# Patient Record
Sex: FEMALE | Race: WHITE | NOT HISPANIC OR LATINO
[De-identification: names, ages, dates, MRNs, and addresses within clinical notes are randomized per-mention and may not be internally consistent; named-entity substitution may affect disease eponyms.]

---

## 2017-03-20 ENCOUNTER — APPOINTMENT (OUTPATIENT)
Dept: PULMONOLOGY | Facility: CLINIC | Age: 62
End: 2017-03-20

## 2017-03-20 VITALS
SYSTOLIC BLOOD PRESSURE: 140 MMHG | WEIGHT: 220 LBS | HEART RATE: 66 BPM | OXYGEN SATURATION: 96 % | HEIGHT: 66 IN | BODY MASS INDEX: 35.36 KG/M2 | TEMPERATURE: 98.2 F | DIASTOLIC BLOOD PRESSURE: 80 MMHG

## 2017-03-20 DIAGNOSIS — R91.1 SOLITARY PULMONARY NODULE: ICD-10-CM

## 2019-09-12 ENCOUNTER — TRANSCRIPTION ENCOUNTER (OUTPATIENT)
Age: 64
End: 2019-09-12

## 2019-09-13 ENCOUNTER — APPOINTMENT (OUTPATIENT)
Dept: BARIATRICS/WEIGHT MGMT | Facility: CLINIC | Age: 64
End: 2019-09-13
Payer: COMMERCIAL

## 2019-09-13 VITALS
DIASTOLIC BLOOD PRESSURE: 82 MMHG | HEART RATE: 56 BPM | OXYGEN SATURATION: 98 % | SYSTOLIC BLOOD PRESSURE: 153 MMHG | BODY MASS INDEX: 33.91 KG/M2 | WEIGHT: 211 LBS | HEIGHT: 66 IN | TEMPERATURE: 98.2 F

## 2019-09-13 PROCEDURE — 99205 OFFICE O/P NEW HI 60 MIN: CPT

## 2019-10-22 NOTE — PHYSICAL EXAM
[de-identified] : high class mallampati  [Normal] : affect appropriate [de-identified] : central adpiosity

## 2019-10-22 NOTE — HISTORY OF PRESENT ILLNESS
[FreeTextEntry1] : 63 yo woman with obesity, DM2, metabolic syndrome, anxiety/depression presents for initial eval and mgt of obesity HAs lost weight a few times in the past but has been regainin recently. still >10% below lifetime max.  Eats 3 meals per day and snacks at night.  tries to eat low fat When she is doing it regularly wlkas/goes to gym 5-7 days per week.  Lately no exercise.

## 2019-10-22 NOTE — ASSESSMENT
[FreeTextEntry1] : 65 yo woman with obesity, DM2, metabolic syndrome, anxiety/depression \par \par Recommend the following:\par check metabolic labs\par whole foods  plant based eating strategy limiting refined carbs and added fats\par keep food journal\par track daily activity\par more moderate intensity cardio\par no added meds for now\par manage ANASTASIA\par rtc in 4 weeks.\par \par

## 2019-10-25 ENCOUNTER — APPOINTMENT (OUTPATIENT)
Dept: BARIATRICS/WEIGHT MGMT | Facility: CLINIC | Age: 64
End: 2019-10-25
Payer: COMMERCIAL

## 2019-10-25 VITALS
OXYGEN SATURATION: 98 % | HEART RATE: 56 BPM | HEIGHT: 66 IN | WEIGHT: 201 LBS | TEMPERATURE: 98.7 F | BODY MASS INDEX: 32.3 KG/M2 | DIASTOLIC BLOOD PRESSURE: 74 MMHG | SYSTOLIC BLOOD PRESSURE: 144 MMHG

## 2019-10-25 PROCEDURE — 99214 OFFICE O/P EST MOD 30 MIN: CPT

## 2019-11-11 NOTE — HISTORY OF PRESENT ILLNESS
[FreeTextEntry1] : 63 yo woman with obesity, DM2, metabolic syndrome, anxiety/depression returns for f/u.  She has switched to a mainly WFPB approach to eating and has lost 10 lbs in previous 6 weeks.  She feels generally very well and has more energy.  she is without new complaints today.  \par

## 2019-11-11 NOTE — ASSESSMENT
[FreeTextEntry1] : BARIATRIC SURGERY HISTORY: none\par \par OBESITY COMORBIDITIES: DM2, metabolic syndrome\par \par ANTI-OBESITY MEDICATIONS: none\par \par OBESITY MEDICATION SIDE EFFECTS: N/A\par \par \par 63 yo woman with obesity, DM2, metabolic syndrome, anxiety/depression \par \par Recommend the following:\par cont current lifestyle changes based on WFPB strategy\par increase physical activity as tolerated\par \par rtc in 4 weeks.\par \par

## 2019-12-06 ENCOUNTER — APPOINTMENT (OUTPATIENT)
Dept: BARIATRICS/WEIGHT MGMT | Facility: CLINIC | Age: 64
End: 2019-12-06
Payer: COMMERCIAL

## 2019-12-06 VITALS
BODY MASS INDEX: 31.02 KG/M2 | DIASTOLIC BLOOD PRESSURE: 74 MMHG | WEIGHT: 193 LBS | TEMPERATURE: 97.5 F | SYSTOLIC BLOOD PRESSURE: 150 MMHG | HEART RATE: 59 BPM | HEIGHT: 66 IN | OXYGEN SATURATION: 98 %

## 2019-12-06 PROCEDURE — 99214 OFFICE O/P EST MOD 30 MIN: CPT

## 2019-12-09 NOTE — REASON FOR VISIT
[Initial Consultation] : an initial consultation for [Hypertension] : hypertension [Obesity] : obesity

## 2019-12-09 NOTE — ASSESSMENT
[FreeTextEntry1] : BARIATRIC SURGERY HISTORY: none\par \par OBESITY COMORBIDITIES: DM2, metabolic syndrome\par \par ANTI-OBESITY MEDICATIONS: none\par \par OBESITY MEDICATION SIDE EFFECTS: N/A\par \par \par 65 yo woman with obesity, DM2, metabolic syndrome, anxiety/depression \par \par Recommend the following:\par cont current lifestyle changes based on WFPB strategy\par increase physical activity as tolerated\par cont with regular f/u\par recheck metabolic labs at 3 month inverval \par rtc in 4 weeks.\par \par

## 2019-12-09 NOTE — HISTORY OF PRESENT ILLNESS
[FreeTextEntry1] : 63 yo woman with obesity, DM2, metabolic syndrome, anxiety/depression returns for f/u.  She continues on a mainly WFPB approach to eating and has lost 18  lbs in previous 10 weeks.  She feels generally very well and has more energy.  she is without new complaints today.  \par

## 2020-01-24 ENCOUNTER — APPOINTMENT (OUTPATIENT)
Dept: BARIATRICS/WEIGHT MGMT | Facility: CLINIC | Age: 65
End: 2020-01-24
Payer: COMMERCIAL

## 2020-01-24 VITALS
WEIGHT: 189 LBS | DIASTOLIC BLOOD PRESSURE: 76 MMHG | SYSTOLIC BLOOD PRESSURE: 150 MMHG | OXYGEN SATURATION: 98 % | BODY MASS INDEX: 30.37 KG/M2 | HEART RATE: 53 BPM | HEIGHT: 66 IN | TEMPERATURE: 98.2 F

## 2020-01-24 DIAGNOSIS — E78.49 OTHER HYPERLIPIDEMIA: ICD-10-CM

## 2020-01-24 PROCEDURE — 99214 OFFICE O/P EST MOD 30 MIN: CPT

## 2020-02-09 PROBLEM — E78.49 OTHER HYPERLIPIDEMIA: Status: ACTIVE | Noted: 2019-10-22

## 2020-02-09 NOTE — HISTORY OF PRESENT ILLNESS
[FreeTextEntry1] : 63 yo woman with obesity, DM2, metabolic syndrome, anxiety/depression returns for f/u.  She continues on a mainly WFPB approach to eating and has lost 22  lbs in previous 16 weeks.  She feels generally very well and has more energy.  she is without new complaints today.  \par

## 2020-03-17 ENCOUNTER — EMERGENCY (EMERGENCY)
Facility: HOSPITAL | Age: 65
LOS: 1 days | Discharge: HOME | End: 2020-03-17
Attending: EMERGENCY MEDICINE | Admitting: EMERGENCY MEDICINE
Payer: COMMERCIAL

## 2020-03-17 VITALS
RESPIRATION RATE: 18 BRPM | HEART RATE: 72 BPM | OXYGEN SATURATION: 96 % | SYSTOLIC BLOOD PRESSURE: 194 MMHG | TEMPERATURE: 99 F | WEIGHT: 177.91 LBS | HEIGHT: 66 IN | DIASTOLIC BLOOD PRESSURE: 76 MMHG

## 2020-03-17 DIAGNOSIS — R05 COUGH: ICD-10-CM

## 2020-03-17 DIAGNOSIS — R53.83 OTHER FATIGUE: ICD-10-CM

## 2020-03-17 DIAGNOSIS — Z20.828 CONTACT WITH AND (SUSPECTED) EXPOSURE TO OTHER VIRAL COMMUNICABLE DISEASES: ICD-10-CM

## 2020-03-17 DIAGNOSIS — R09.81 NASAL CONGESTION: ICD-10-CM

## 2020-03-17 DIAGNOSIS — R50.9 FEVER, UNSPECIFIED: ICD-10-CM

## 2020-03-17 PROCEDURE — 99283 EMERGENCY DEPT VISIT LOW MDM: CPT | Mod: 25

## 2020-03-17 PROCEDURE — 93005 ELECTROCARDIOGRAM TRACING: CPT

## 2020-03-17 PROCEDURE — 71045 X-RAY EXAM CHEST 1 VIEW: CPT

## 2020-03-17 PROCEDURE — 71045 X-RAY EXAM CHEST 1 VIEW: CPT | Mod: 26

## 2020-03-17 PROCEDURE — 87798 DETECT AGENT NOS DNA AMP: CPT

## 2020-03-17 PROCEDURE — 93010 ELECTROCARDIOGRAM REPORT: CPT

## 2020-03-17 PROCEDURE — 87486 CHLMYD PNEUM DNA AMP PROBE: CPT

## 2020-03-17 PROCEDURE — 87633 RESP VIRUS 12-25 TARGETS: CPT

## 2020-03-17 PROCEDURE — 87635 SARS-COV-2 COVID-19 AMP PRB: CPT

## 2020-03-17 PROCEDURE — 87581 M.PNEUMON DNA AMP PROBE: CPT

## 2020-03-17 PROCEDURE — 99284 EMERGENCY DEPT VISIT MOD MDM: CPT | Mod: 25

## 2020-03-17 RX ORDER — ACETAMINOPHEN 500 MG
650 TABLET ORAL ONCE
Refills: 0 | Status: COMPLETED | OUTPATIENT
Start: 2020-03-17 | End: 2020-03-17

## 2020-03-17 RX ADMIN — Medication 650 MILLIGRAM(S): at 22:42

## 2020-03-17 NOTE — ED PROVIDER NOTE - OBJECTIVE STATEMENT
64F pmh htn, cad, dm p/w sob, cough, fatigue, fever 101F at home, nasal congestion. Worsening sob today. +COVID contact in past 2 wks via daughter-in-law. No abd pain, no cp, no n/v, dysuria, no unilateral weakness.

## 2020-03-17 NOTE — ED PROVIDER NOTE - DIAGNOSTIC INTERPRETATION
Chest x-ray interpreted by ER Physician Dr. Maynard  Findings: heart size normal, no infiltrates, no pleural effusion, no PTX, no appreciated fracture.

## 2020-03-17 NOTE — ED PROVIDER NOTE - PATIENT PORTAL LINK FT
You can access the FollowMyHealth Patient Portal offered by  by registering at the following website: [email or web address]. By joining [employer]’s FollowMyHealth portal, you will also be able to view your health information using other applications (apps) compatible with our system.

## 2020-03-17 NOTE — ED PROVIDER NOTE - CLINICAL SUMMARY MEDICAL DECISION MAKING FREE TEXT BOX
64F pmh htn, cad, dm p/w sob, cough, fatigue, fever 101F at home, nasal congestion. Worsening sob today. +COVID contact in past 2 wks via daughter-in-law. No abd pain, no cp, no n/v, dysuria, no unilateral weakness. Exam noted for cough, nasal congestion, no rales, no wheeze. Concern viral uri, consider COVID given pandemic & exposure, less likely pna, doubt cardiac given sx at this time. Feeling much improved, no acute life threatening illness. Pt seeking discharge.

## 2020-03-17 NOTE — ED ADULT NURSE NOTE - OBJECTIVE STATEMENT
Pt presents reporting cough and shortness of breath after being exposed to her daughter in law who recently tested positive for COVID19. Pt presents speaking in full sentences without difficulty. Pt reports no hx of fevers at home. Pt reports hx of DM and HTN.

## 2020-03-17 NOTE — ED PROVIDER NOTE - NSFOLLOWUPINSTRUCTIONS_ED_ALL_ED_FT
Immediately return to the Emergency Department or call 911 for any high fever, trouble breathing, severe vomiting, worsening pain, or any other concerns.    You have been swabbed for COVID-19 as well as a variety of viruses which may cause your symptoms.     Viral Respiratory Infection    A viral respiratory infection is an illness that affects parts of the body used for breathing, like the lungs, nose, and throat. It is caused by a germ called a virus. Symptoms can include runny nose, coughing, sneezing, fatigue, body aches, sore throat, fever, or headache. Over the counter medicine can be used to manage the symptoms but the infection typically goes away on its own in 5 to 10 days.     SEEK IMMEDIATE MEDICAL CARE IF YOU HAVE ANY OF THE FOLLOWING SYMPTOMS: shortness of breath, chest pain, fever over 10 days, or lightheadedness/dizziness.    You must self quarantine for 14 days until cleared of symptoms. There is a high probability you have the current corona virus (COVID-19)    Novel Coronavirus (COVID-19)  Patient Discharge Packet    What is a coronavirus?  Coronaviruses are a large family of viruses that cause illnesses ranging from the common cold to more severe diseases such as Middle East Respiratory Syndrome (MERS) and Severe Acute Respiratory Syndrome (SARS).    What is Novel Coronavirus (COVID-19)?  COVID-19 is a new strain of Coronavirus that has not been previously identified in humans. COVID-19 was identified in [place], [place], [place] in December 2019 (COVID-19). COVID-19 has since been identified outside of [place], in a growing number of countries internationally, including the [place].  Where can I find the most recent information about COVID-19?  The Centers for Disease Control and Prevention (CDC) is closely monitoring the outbreak caused by the COVID-19. For the latest information about COVID- 19, visit the CDC website at https://www.cdc.gov/coronavirus/index.html    How are coronaviruses spread?  Coronaviruses can be transmitted from person-to- person, usually after close contact with an infected person, for example, in a household, workplace, or healthcare setting via droplets that become airborne after a cough or sneeze by an affected person. These droplets can then infect a nearby person. It is likely transmission also occurs by touching recently contaminated surfaces.    What are the symptoms of coronavirus infection?  It depends on the virus, but common signs include fever and/or respiratory symptoms such as cough and shortness of breath. In more severe cases, infection can cause pneumonia, severe acute respiratory syndrome, kidney failure and even death. Fortunately, most cases of COVID-19 have an illness no different than the influenza “flu”. With a majority of these patients having mild symptoms and overall mortality which appears to be not much different than the flu.    Is there a treatment for a COVID-19?  There is no specific treatment for disease caused by COVID-19. However, many of the symptoms can be treated based on the patient’s clinical condition. Supportive care for infected persons can be highly effective.    What can I do to protect myself?  Washing your hands, covering your cough, and disinfecting surfaces are the best precautionary measures. It is also advisable to avoid close contact with anyone showing symptoms of respiratory illness such as coughing and sneezing. Those with symptoms should wear a surgical mask when around others.    What can I do to protect those around me?  If you have been identified as someone who may be infected with COVID-19, we recommend you follow the self-isolation procedures outlined below to protect those around you and limit the spread of this virus.    March 3, 2020  Recommendations for Patients Advised to Self-Isolate for Possible COVID-19 Exposure We recommend the below precautionary steps from now until 14 days from when you returned from your travel or date of your last known possible contact:  ? Do not go to work, school, or public areas. Avoid using public transportation, ride-sharing, or taxis. ? As much as possible, separate yourself from other people in your home. If you can, you should stay in a room and away from other people in your home. Also, you should use a separate bathroom, if available. ? Wear the supplied mask whenever you are around other people. ? If you have a non-urgent medical appointment, please reschedule for a later date. If the appointment is urgent, please call the healthcare provider and tell them that you are on self-isolation for possible COVID-19. This will help the healthcare provider’s office take steps to keep other people from getting infected or exposed. If you can reschedule routine appointments, do so. ? Wash your hands often with soap and water for at least 15 to 20 seconds or clean your hands with an alcohol-based hand  that contains 60 to 95% alcohol, covering all surfaces of your hands and rubbing them together until they feel dry. Soap and water should be used preferentially if hands are visibly dirty. ? Cover your mouth and nose with a tissue when you cough or sneeze. Throw used tissues in a lined trash can; immediately wash your hands. ? Avoid touching your eyes, nose, and mouth with your hands. ? Avoid sharing personal household items. You should not share dishes, drinking glasses, cups, eating utensils, towels, or bedding with other people or pets in your home. After using these items, they should be washed thoroughly with soap and water. ? Clean and disinfect all “high-touch” surfaces every day. High touch surfaces include counters, tabletops, doorknobs, light switches, remote controls, bathroom fixtures, toilets, phones, keyboards, tablets, and bedside tables. Also, clean any surfaces that may have blood, stool, or body fluids on them.

## 2020-03-17 NOTE — ED PROVIDER NOTE - PHYSICAL EXAMINATION
VITAL SIGNS: I have reviewed nursing notes and confirm.  CONSTITUTIONAL: Well-developed; well-nourished; in no acute distress.  SKIN: Skin is warm and dry, no acute rash.  HEAD: Normocephalic; atraumatic.  EYES:  EOM intact; conjunctiva and sclera clear.  ENT: +nasal discharge; airway clear.  NECK: Supple; Voluntary FROM  CARD: No rubs appreciated, Regular rate and rhythm.  RESP: No wheezes, no rales. No respiratory distress. +occasional cough  ABD: Soft; non-distended; non-tender; no rebound or guarding  EXT: Normal ROM. No cyanosis or edema.  NEURO: Alert, oriented. Grossly unremarkable.  PSYCH: Cooperative, appropriate.

## 2020-03-17 NOTE — ED ADULT NURSE NOTE - NSIMPLEMENTINTERV_GEN_ALL_ED
Implemented All Universal Safety Interventions:  [place] to call system. Call bell, personal items and telephone within reach. Instruct patient to call for assistance. Room bathroom lighting operational. Non-slip footwear when patient is off stretcher. Physically safe environment: no spills, clutter or unnecessary equipment. Stretcher in lowest position, wheels locked, appropriate side rails in place.

## 2020-03-18 LAB — RAPID RVP RESULT: SIGNIFICANT CHANGE UP

## 2020-03-23 LAB — SARS-COV-2 RNA SPEC QL NAA+PROBE: DETECTED

## 2020-04-10 ENCOUNTER — APPOINTMENT (OUTPATIENT)
Dept: BARIATRICS/WEIGHT MGMT | Facility: CLINIC | Age: 65
End: 2020-04-10
Payer: COMMERCIAL

## 2020-04-10 PROCEDURE — 99446 NTRPROF PH1/NTRNET/EHR 5-10: CPT

## 2020-07-10 ENCOUNTER — TRANSCRIPTION ENCOUNTER (OUTPATIENT)
Age: 65
End: 2020-07-10

## 2021-06-17 NOTE — ED PROVIDER NOTE - BIRTH SEX
History  Chief Complaint   Patient presents with    Fall     pt poor historian, reports was anxious and fell forward, denies head strike but complains of pain to right foot and left shin       57 y o  F p/w right foot and left shin x 8 hours ago  Pt tripped at home while walking into kitchen  Denies LOC or head strike  Having pain at left shin and top of right foot  Pt has been ambulatory  She has not taken anything for pain  History provided by:  Patient   used: Yes (Family)    Fall  Mechanism of injury: fall    Injury location:  Leg and foot  Leg injury location:  L lower leg  Foot injury location:  Top of R foot  Incident location:  Home  Time since incident:  8 hours  Arrived directly from scene: no    Suspicion of alcohol use: no    Suspicion of drug use: no    Prior to arrival data:     Bystander interventions:  None    Loss of consciousness: no      Amnesic to event: no      Immobilization:  None  Associated symptoms: no abdominal pain, no headaches, no loss of consciousness, no nausea, no neck pain and no vomiting        Prior to Admission Medications   Prescriptions Last Dose Informant Patient Reported? Taking?    Blood Glucose Monitoring Suppl (ONE TOUCH ULTRA 2) w/Device KIT  Self No No   Sig: by Does not apply route 3 (three) times a day   Continuous Blood Gluc  (FreeStyle Broderick 14 Day Boomer) [facility]   No No   Sig: Use 1 Device daily   Continuous Blood Gluc Sensor (FreeStyle Broderick 14 Day Sensor) [facility]   No No   Sig: Use 1 each every 14 (fourteen) days   DULoxetine (CYMBALTA) 30 mg delayed release capsule   No Yes   Sig: TAKE 1 CAPSULE BY MOUTH 2 (TWICE)  A DAY   Dulaglutide (Trulicity) 1 5 WN/4 7UF SOPN  Self No No   Sig: Inject 0 5 mL (1 5 mg total) under the skin once a week   Patient not taking: Reported on 5/19/2021   Insulin Disposable Pump (V-Go 40) KIT   No Yes   Sig: Use daily   Insulin Pen Needle (BD Pen Needle Emily U/F) 32G X 4 MM MISC  Self No No   Sig: USE FOUR (4) TIMES A DAY   Levemir FlexTouch 100 units/mL injection pen  Self No No   Sig: INJECT 45 UNITS UNDER THE SKIN DAILY AT BEDTIME   OneTouch Delica Lancets 21L MISC   No No   Sig: USE 3 (THREE) TIMES A DAY   OneTouch Ultra test strip   No No   Sig: USE 3 (THREE) TIMES A DAY USE AS INSTRUCTED   albuterol (2 5 mg/3 mL) 0 083 % nebulizer solution  Self No Yes   Sig: Take 1 vial (2 5 mg total) by nebulization every 6 (six) hours as needed for wheezing or shortness of breath   albuterol (PROVENTIL HFA,VENTOLIN HFA) 90 mcg/act inhaler  Self Yes Yes   Sig: Inhale 1 puff every 4 (four) hours as needed     amLODIPine (NORVASC) 10 mg tablet   No Yes   Sig: TAKE 1 TABLET BY MOUTH DAILY   anastrozole (ARIMIDEX) 1 mg tablet  Self No Yes   Sig: TAKE 1 TABLET BY MOUTH DAILY   aspirin 81 mg chewable tablet  Self Yes Yes   Sig: Chew 81 mg daily   atorvastatin (LIPITOR) 10 mg tablet   No Yes   Sig: TAKE 1 TABLET (10 MG TOTAL) BY MOUTH DAILY   cyclobenzaprine (FLEXERIL) 10 mg tablet   No Yes   Sig: Take 1 tablet (10 mg total) by mouth daily at bedtime   diclofenac sodium (VOLTAREN) 1 %  Self No No   Sig: Apply 2 g topically 4 (four) times a day   Patient not taking: Reported on 5/19/2021   fluticasone-salmeterol (Advair HFA) 115-21 MCG/ACT inhaler   No Yes   Sig: Inhale 2 puffs 2 (two) times a day Rinse mouth after use  fluticasone-umeclidinium-vilanterol (Trelegy Ellipta) 100-62 5-25 MCG/INH inhaler   Yes Yes   Sig: Inhale 1 puff daily Rinse mouth after use  insulin lispro (HumaLOG) 100 units/mL injection   No Yes   Sig: Inject 76 Units under the skin daily via V-Go device     insulin lispro (HumaLOG) 100 units/mL injection pen  Self No Yes   Sig: Inject 16 Units under the skin 3 (three) times a day with meals Inject 16 units TID   levothyroxine 50 mcg tablet  Self No No   Sig: Take 1 tablet (50 mcg total) by mouth daily   lidocaine (XYLOCAINE) 5 % ointment  Self No No   Sig: Apply topically as needed for mild pain Apply 5 minutes prior to insulin injection   Patient not taking: Reported on 5/19/2021   loratadine (CLARITIN) 10 mg tablet   No No   Sig: Take 1 tablet (10 mg total) by mouth daily   losartan (COZAAR) 50 mg tablet   No No   Sig: TAKE 1 TABLET (50 MG TOTAL) BY MOUTH DAILY   magnesium gluconate (MAGONATE) 500 mg tablet   No No   Sig: Take 1 tablet (500 mg total) by mouth daily   metoprolol tartrate (LOPRESSOR) 25 mg tablet  Self No No   Sig: Take 0 5 tablets (12 5 mg total) by mouth every 12 (twelve) hours   omeprazole (PriLOSEC) 20 mg delayed release capsule   No No   Sig: TAKE 1 CAPSULE (20 MG TOTAL) BY MOUTH DAILY   oxyCODONE (ROXICODONE) 5 mg immediate release tablet  Self Yes No   Sig: Take 5 mg by mouth every 6 (six) hours as needed for moderate pain      Facility-Administered Medications: None       Past Medical History:   Diagnosis Date    Abdominal infection (HCC)     h-pylori    Abnormal chest x-ray 4/5/2019    Asthma     Breast cancer ([facility] ) 06/26/2018    Cancer ([facility] )     BREAST    Diabetes mellitus ([facility] )     Hiatal hernia     History of chemotherapy     History of radiation therapy     Hypercholesterolemia     Hypertension     Hypothyroid     Renal disorder     Rheumatoid arthritis ([facility] )        Past Surgical History:   Procedure Laterality Date    BREAST BIOPSY Right 05/23/2018    DCIS    BREAST LUMPECTOMY Right 6/26/2018    Procedure: RIGHT BREAST NEEDLE LOCALIZATION X2 WITH RIGHT BREAST LUMPECTOMY ( NEEDLE LOC @ 1000); Surgeon: Maddison Tam MD;  Location: [facility];  Service: Surgical Oncology    BREAST LUMPECTOMY Right 8/2/2018    Procedure: LYMPHOSCINITGRAPHY INTRAOPERATIVE LYMPHATIC MAPPING , RIGHT SENTINEL NODE BIOPSY, REEXCISION  RIGHT BREAST LUMPECTOMY CAVITY (SUPERIOR MARGIN);   Surgeon: Maddison Tam MD;  [facility];  Service: Surgical Oncology    BREAST SURGERY Left     CATARACT EXTRACTION, BILATERAL Bilateral     EGD AND COLONOSCOPY N/A 9/5/2018    Procedure: EGD AND COLONOSCOPY;  Surgeon: Harrold Buerger, MD;  Location[facility]; Service: Gastroenterology    [facility] GUIDED CENTRAL VENOUS ACCESS DEVICE INSERTION  9/13/2018    KNEE SURGERY Right     MAMMO NEEDLE LOCALIZATION RIGHT (ALL INC) Right 6/26/2018    MAMMO STEREOTACTIC BREAST BIOPSY RIGHT (ALL INC) Right 5/23/2018    RETINAL DETACHMENT SURGERY Right     TUBAL LIGATION      TUNNELED VENOUS PORT PLACEMENT Left 9/13/2018    Procedure: INSERTION VENOUS PORT (PORT-A-CATH); Surgeon: Julio Collado MD;  [facility];  Service: Surgical Oncology       Family History   Problem Relation Age of Onset    Diabetes Mother     Hypertension Mother     Diabetes Father     Hypertension Father     Diabetes Brother     Hypertension Brother     Prostate cancer Brother     Cancer Maternal Grandfather     No Known Problems Sister     No Known Problems Daughter     No Known Problems Sister     No Known Problems Sister     No Known Problems Sister     No Known Problems Sister     No Known Problems Daughter     No Known Problems Daughter      I have reviewed and agree with the history as documented  E-Cigarette/Vaping    E-Cigarette Use Never User      E-Cigarette/Vaping Substances    Nicotine No     THC No     CBD No     Flavoring No     Other No     Unknown No      Social History     Tobacco Use    Smoking status: Never Smoker    Smokeless tobacco: Never Used   Vaping Use    Vaping Use: Never used   Substance Use Topics    Alcohol use: No    Drug use: No       Review of Systems   Gastrointestinal: Negative for abdominal pain, nausea and vomiting  Musculoskeletal: Negative for neck pain  Left shin and right foot   Neurological: Negative for loss of consciousness and headaches  All other systems reviewed and are negative  Physical Exam  Physical Exam  Vitals and nursing note reviewed  Constitutional:       General: She is not in acute distress       Appearance: She is well-developed  She is not ill-appearing, toxic-appearing or diaphoretic  HENT:      Right Ear: External ear normal       Left Ear: External ear normal    Eyes:      General: No scleral icterus  Conjunctiva/sclera:      Right eye: Right conjunctiva is not injected  Left eye: Left conjunctiva is not injected  Neck:      Vascular: No JVD  Trachea: Phonation normal    Cardiovascular:      Pulses: Normal pulses  Dorsalis pedis pulses are 2+ on the right side and 2+ on the left side  Pulmonary:      Effort: Pulmonary effort is normal       Breath sounds: No stridor  Musculoskeletal:         General: No deformity  Normal range of motion  Right knee: Normal       Left knee: Normal       Right lower leg: Normal       Left lower leg: Tenderness (anterolateral shin) present  No swelling or deformity  Right ankle: Normal       Left ankle: Normal       Right foot: Tenderness (Top of foot) present  No swelling, laceration or crepitus  Normal pulse  Left foot: Normal pulse  Comments: No gross deformity  No obvious swelling  No signs of septic joint  Skin:     General: Skin is warm and dry  Findings: Abrasion (left shin ) present  No bruising, ecchymosis, erythema, laceration or rash  Comments: No signs of septic joint  No crepitus  No discoloration  No excessive warmth  Neurological:      Mental Status: She is alert  Sensory: No sensory deficit  Motor: Motor function is intact     Psychiatric:         Behavior: Behavior normal          Vital Signs  ED Triage Vitals [06/17/21 1514]   Temperature Pulse Respirations Blood Pressure SpO2   98 1 °F (36 7 °C) 96 19 150/72 99 %      Temp Source Heart Rate Source Patient Position - Orthostatic VS BP Location FiO2 (%)   Oral Monitor -- -- --      Pain Score       Worst Possible Pain           Vitals:    06/17/21 1514   BP: 150/72   Pulse: 96         Visual Acuity      ED Medications  Medications acetaminophen (TYLENOL) tablet 650 mg (650 mg Oral Given 6/17/21 1557)       Diagnostic Studies  Results Reviewed     None                 XR tibia fibula 2 views LEFT   ED Interpretation by Augustin Crews,  (06/17 1556)   No fracture      XR foot 3+ views RIGHT   ED Interpretation by Augustin Crews,  (06/17 1557)   No fracture                 Procedures  Procedures         ED Course  ED Course as of Jun 17 1620   Thu Jun 17, 2021   1601 Pt talking on cell phone in NAD  Updated pt/family on negative xrays  Instructed her to take Tylenol for pain  [id number] Family wants something "stronger than Tylenol "  Will give rx for Naproxen  80 Family states they are going to [facility] to "get a stronger pain medicine "                                SBIRT 20yo+      Most Recent Value   SBIRT (24 yo +)   In order to provide better care to our patients, we are screening all of our patients for alcohol and drug use  Would it be okay to ask you these screening questions? Yes Filed at: 06/17/2021 1547   Initial Alcohol Screen: US AUDIT-C    1  How often do you have a drink containing alcohol?  0 Filed at: 06/17/2021 1547   2  How many drinks containing alcohol do you have on a typical day you are drinking? 0 Filed at: 06/17/2021 1547   3a  Male UNDER 65: How often do you have five or more drinks on one occasion? 0 Filed at: 06/17/2021 1547   3b  FEMALE Any Age, or MALE 65+: How often do you have 4 or more drinks on one occassion? 0 Filed at: 06/17/2021 1547   Audit-C Score  0 Filed at: 06/17/2021 1547   MARGO: How many times in the past year have you    Used an illegal drug or used a prescription medication for non-medical reasons?   Never Filed at: 06/17/2021 1547                    MDM    Disposition  Final diagnoses:   Abrasion of left leg   Left leg pain   Right foot pain     Time reflects when diagnosis was documented in both MDM as applicable and the Disposition within this note     Time User Action Codes Description Comment    6/17/2021  3:57 PM Bk Luis Alberto Munroe Abrasion of left leg     6/17/2021  3:57 PM [address] Left leg pain     6/17/2021  3:57 PM [address] Right foot pain       ED Disposition     ED Disposition Condition Date/Time Comment    Discharge Stable Thu Jun 17, 2021  3:58 PM Jolieradha Mulligan discharge to home/self care  Follow-up Information    None         Patient's Medications   Discharge Prescriptions    ACETAMINOPHEN (TYLENOL) 500 MG TABLET    Take 1 tablet (500 mg total) by mouth every 6 (six) hours as needed for mild pain       Start Date: 6/17/2021 End Date: --       Order Dose: 500 mg       Quantity: 14 tablet    Refills: 0    NAPROXEN (NAPROSYN) 500 MG TABLET    Take 1 tablet (500 mg total) by mouth 2 (two) times a day with meals       Start Date: 6/17/2021 End Date: --       Order Dose: 500 mg       Quantity: 10 tablet    Refills: 0     No discharge procedures on file      PDMP Review     None          ED Provider  Electronically Signed by           Micky Gilford, DO  06/17/21 9320 Female

## 2022-01-11 ENCOUNTER — APPOINTMENT (OUTPATIENT)
Dept: BARIATRICS/WEIGHT MGMT | Facility: CLINIC | Age: 67
End: 2022-01-11
Payer: COMMERCIAL

## 2022-01-11 VITALS
OXYGEN SATURATION: 98 % | HEART RATE: 59 BPM | WEIGHT: 194.5 LBS | HEIGHT: 66 IN | DIASTOLIC BLOOD PRESSURE: 70 MMHG | SYSTOLIC BLOOD PRESSURE: 140 MMHG | BODY MASS INDEX: 31.26 KG/M2

## 2022-01-11 PROCEDURE — 99214 OFFICE O/P EST MOD 30 MIN: CPT

## 2022-01-13 ENCOUNTER — APPOINTMENT (OUTPATIENT)
Dept: INTERNAL MEDICINE | Facility: CLINIC | Age: 67
End: 2022-01-13
Payer: COMMERCIAL

## 2022-01-13 ENCOUNTER — RESULT REVIEW (OUTPATIENT)
Age: 67
End: 2022-01-13

## 2022-01-13 VITALS
DIASTOLIC BLOOD PRESSURE: 62 MMHG | HEART RATE: 66 BPM | SYSTOLIC BLOOD PRESSURE: 126 MMHG | OXYGEN SATURATION: 98 % | TEMPERATURE: 97.8 F | WEIGHT: 197 LBS | BODY MASS INDEX: 31.8 KG/M2

## 2022-01-13 DIAGNOSIS — Z82.2 FAMILY HISTORY OF DEAFNESS AND HEARING LOSS: ICD-10-CM

## 2022-01-13 DIAGNOSIS — F41.9 ANXIETY DISORDER, UNSPECIFIED: ICD-10-CM

## 2022-01-13 DIAGNOSIS — R06.83 SNORING: ICD-10-CM

## 2022-01-13 DIAGNOSIS — M79.642 PAIN IN LEFT HAND: ICD-10-CM

## 2022-01-13 DIAGNOSIS — R53.83 OTHER FATIGUE: ICD-10-CM

## 2022-01-13 DIAGNOSIS — F32.A ANXIETY DISORDER, UNSPECIFIED: ICD-10-CM

## 2022-01-13 DIAGNOSIS — Z23 ENCOUNTER FOR IMMUNIZATION: ICD-10-CM

## 2022-01-13 DIAGNOSIS — Z82.49 FAMILY HISTORY OF ISCHEMIC HEART DISEASE AND OTHER DISEASES OF THE CIRCULATORY SYSTEM: ICD-10-CM

## 2022-01-13 DIAGNOSIS — Z83.3 FAMILY HISTORY OF DIABETES MELLITUS: ICD-10-CM

## 2022-01-13 DIAGNOSIS — Z78.9 OTHER SPECIFIED HEALTH STATUS: ICD-10-CM

## 2022-01-13 DIAGNOSIS — F10.21 ALCOHOL DEPENDENCE, IN REMISSION: ICD-10-CM

## 2022-01-13 PROCEDURE — G0444 DEPRESSION SCREEN ANNUAL: CPT | Mod: 59

## 2022-01-13 PROCEDURE — 99205 OFFICE O/P NEW HI 60 MIN: CPT | Mod: 25

## 2022-01-13 PROCEDURE — G0442 ANNUAL ALCOHOL SCREEN 15 MIN: CPT

## 2022-01-13 RX ORDER — ESCITALOPRAM OXALATE 10 MG/1
10 TABLET ORAL
Refills: 0 | Status: ACTIVE | COMMUNITY
Start: 2021-05-07

## 2022-01-13 RX ORDER — AMLODIPINE AND ATORVASTATIN 5; 10 MG/1; MG/1
5-10 TABLET, COATED ORAL DAILY
Refills: 0 | Status: ACTIVE | COMMUNITY
Start: 2022-01-13

## 2022-01-13 RX ORDER — DULAGLUTIDE 0.75 MG/.5ML
0.75 INJECTION, SOLUTION SUBCUTANEOUS
Qty: 4 | Refills: 1 | Status: ACTIVE | COMMUNITY
Start: 2022-01-13

## 2022-01-13 RX ORDER — ASPIRIN ENTERIC COATED TABLETS 81 MG 81 MG/1
81 TABLET, DELAYED RELEASE ORAL
Refills: 0 | Status: ACTIVE | COMMUNITY
Start: 2022-01-13

## 2022-01-13 RX ORDER — MULTIVIT-MIN/FOLIC/VIT K/LYCOP 400-300MCG
25 MCG TABLET ORAL
Refills: 0 | Status: ACTIVE | COMMUNITY
Start: 2022-01-13

## 2022-01-13 RX ORDER — B-COMPLEX WITH VITAMIN C
TABLET ORAL
Refills: 0 | Status: ACTIVE | COMMUNITY
Start: 2022-01-13

## 2022-01-13 RX ORDER — LOSARTAN POTASSIUM AND HYDROCHLOROTHIAZIDE 25; 100 MG/1; MG/1
100-25 TABLET ORAL
Qty: 90 | Refills: 0 | Status: COMPLETED | COMMUNITY
Start: 2021-08-31

## 2022-01-13 RX ORDER — AMLODIPINE BESYLATE 5 MG/1
5 TABLET ORAL
Qty: 90 | Refills: 0 | Status: DISCONTINUED | COMMUNITY
Start: 2021-05-07

## 2022-01-13 RX ORDER — DULAGLUTIDE 1.5 MG/.5ML
1.5 INJECTION, SOLUTION SUBCUTANEOUS
Qty: 6 | Refills: 0 | Status: DISCONTINUED | COMMUNITY
Start: 2021-05-11

## 2022-01-13 NOTE — PHYSICAL EXAM
[No Acute Distress] : no acute distress [Well-Appearing] : well-appearing [Normal Sclera/Conjunctiva] : normal sclera/conjunctiva [EOMI] : extraocular movements intact [Normal Outer Ear/Nose] : the outer ears and nose were normal in appearance [No JVD] : no jugular venous distention [Supple] : supple [No Respiratory Distress] : no respiratory distress  [No Accessory Muscle Use] : no accessory muscle use [Clear to Auscultation] : lungs were clear to auscultation bilaterally [Normal Rate] : normal rate  [Regular Rhythm] : with a regular rhythm [Normal S1, S2] : normal S1 and S2 [No Edema] : there was no peripheral edema [Soft] : abdomen soft [No CVA Tenderness] : no CVA  tenderness [No Spinal Tenderness] : no spinal tenderness [No Joint Swelling] : no joint swelling [Grossly Normal Strength/Tone] : grossly normal strength/tone [No Rash] : no rash [Coordination Grossly Intact] : coordination grossly intact [No Focal Deficits] : no focal deficits [Normal Gait] : normal gait [Normal Affect] : the affect was normal [Normal Mood] : the mood was normal [Normal Insight/Judgement] : insight and judgment were intact [de-identified] : Mallampatti 3+ [de-identified] : varicose veins, spider veins

## 2022-01-13 NOTE — REVIEW OF SYSTEMS
[Hearing Loss] : hearing loss [Joint Pain] : joint pain [Anxiety] : anxiety [Depression] : depression [Negative] : Heme/Lymph [Nasal Discharge] : no nasal discharge [Back Pain] : no back pain [Suicidal] : not suicidal [FreeTextEntry4] : snoring [FreeTextEntry9] : thumb pain

## 2022-01-13 NOTE — ASSESSMENT
[FreeTextEntry1] : 66F PMH obesity, HTN, T2DM, HLD, anxiety/depression, bilateral carpal tunnel, spontaneous pneumothorax, who presents as a new patient to establish care.\par \par # Hand pain: Primarily thumb and palm, patient has hx bilateral carpal tunnel, tried using thumb splint without success.\par - X-ray hand/wrist \par - may consider trial of wrist splint\par - Will consider Ortho referral vs occupational therapy referral pending x-ray results.\par \par # Fatigue: Reporting non-restful sleep, snoring, feeling like falling asleep during the day, with associated obesity, HTN, and mallampatti 3-4 puts patient at high risk for ANASTASIA.\par - C/w weight management\par - Sleep study referral\par \par # Metabolic syndrome: \par - C/w weight management\par - C/w trulicity as per weight management\par - C/w current BP and cholesterol medications for now\par - BMP, lipid panel, A1c\par - annual ophtho\par \par # Depression: \par - C/w lexapro\par - C/w therapist\par - C/w healthy lifestyle habits\par - F/u sleep referral\par \par # Venous insufficiency: varicose veins and spider veins\par - regular movement, avoid prolonged sitting/standing\par - foot elevation\par - c/w weight loss\par - compression stockings\par \par # HCM:\par - Pneumovax-23 \par - CBC, CMP, Lipid, A1c, Vit D, TSH

## 2022-01-13 NOTE — HEALTH RISK ASSESSMENT
[Former] : Former [20 or more] : 20 or more [1 or 2 (0 pts)] : 1 or 2 (0 points) [Never (0 pts)] : Never (0 points) [No] : In the past 12 months have you used drugs other than those required for medical reasons? No [1] : 2) Feeling down, depressed, or hopeless for several days (1) [PHQ-2 Positive] : PHQ-2 Positive [I have developed a follow-up plan documented below in the note.] : I have developed a follow-up plan documented below in the note. [Patient reported mammogram was normal] : Patient reported mammogram was normal [Patient reported PAP Smear was normal] : Patient reported PAP Smear was normal [No Retinopathy] : No retinopathy [YearQuit] : 1984 [[id number]] : 2 [EyeExamDate] : 12/21 [MammogramDate] : 01/14 [PapSmearDate] : 01/14 [ColonoscopyDate] : 01/19 [ColonoscopyComments] : Unknown result, to follow-up with GI for result

## 2022-01-13 NOTE — HISTORY OF PRESENT ILLNESS
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
[de-identified] : 66F PMH obesity, HTN, T2DM, HLD, anxiety/depression, bilateral carpal tunnel, spontaneous pneumothorax, who presents as a new patient to establish care.\par \par Her only acute complaint is chronic pain in the L hand, > 6 months, and associated weakness. Notes pain when she flexes the thumb and touches it to any of her fingers, as well as with pressure to the thenar eminence and proximal palm region. She has a history of carpal tunnel surgery bilaterally. Notes pain when she picks up her grandkids. No pain when typing, some on phone but doesn't use it too much. Her daughter had similar pain and a thumb brace which she tried for a while but it didn't help. \par She has used Tylenol a few times.\par \par Notes sleep is not restful, fatigue and feels she is falling asleep during the day, she does snore. She scheduled a sleep study several years ago but other things came up and she never went through with it. \par \par Obesity/DM: Following with Stu Calvert in weight management, restarted Trulicity 0.75 mg/wk, states she will be increasing to 1.5 mg/wk soon. \par Last ophtho appt 1 month ago. No changes in sensation or healing of feet. \par \par Depression: She feels her depression is well controlled on Lexapro, working with her therapist, and is enjoying spending time with her grandchildren, and enjoys her job.\par \par Immunizations: She was vaccinated against COVID-19 w/ Moderna x2, and received Pfizer as a booster. She had her flu shot this year, and is up to date with TDaP, Prevnar, and Shingrix. She has not had Pneumovax. \par \par Social Hx: She denies current tobacco, alcohol, or drug use. She is a former alcoholic, has not had a drink in 13 years. 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
Daniella Robb

## 2022-01-14 LAB
25(OH)D3 SERPL-MCNC: 20.9 NG/ML
ALBUMIN SERPL ELPH-MCNC: 4.5 G/DL
ALP BLD-CCNC: 95 U/L
ALT SERPL-CCNC: 16 U/L
ANION GAP SERPL CALC-SCNC: 12 MMOL/L
AST SERPL-CCNC: 20 U/L
BASOPHILS # BLD AUTO: 0.07 K/UL
BASOPHILS NFR BLD AUTO: 1.1 %
BILIRUB SERPL-MCNC: 0.5 MG/DL
BUN SERPL-MCNC: 17 MG/DL
CALCIUM SERPL-MCNC: 10.5 MG/DL
CHLORIDE SERPL-SCNC: 102 MMOL/L
CHOLEST SERPL-MCNC: 142 MG/DL
CO2 SERPL-SCNC: 27 MMOL/L
CREAT SERPL-MCNC: 1.02 MG/DL
EOSINOPHIL # BLD AUTO: 0.09 K/UL
EOSINOPHIL NFR BLD AUTO: 1.4 %
ESTIMATED AVERAGE GLUCOSE: 103 MG/DL
GLUCOSE SERPL-MCNC: 90 MG/DL
HBA1C MFR BLD HPLC: 5.2 %
HCT VFR BLD CALC: 36 %
HDLC SERPL-MCNC: 57 MG/DL
HGB BLD-MCNC: 12 G/DL
IMM GRANULOCYTES NFR BLD AUTO: 0.2 %
LDLC SERPL CALC-MCNC: 72 MG/DL
LYMPHOCYTES # BLD AUTO: 2.27 K/UL
LYMPHOCYTES NFR BLD AUTO: 34.3 %
MAN DIFF?: NORMAL
MCHC RBC-ENTMCNC: 32.2 PG
MCHC RBC-ENTMCNC: 33.3 GM/DL
MCV RBC AUTO: 96.5 FL
MONOCYTES # BLD AUTO: 0.68 K/UL
MONOCYTES NFR BLD AUTO: 10.3 %
NEUTROPHILS # BLD AUTO: 3.49 K/UL
NEUTROPHILS NFR BLD AUTO: 52.7 %
NONHDLC SERPL-MCNC: 85 MG/DL
PLATELET # BLD AUTO: 235 K/UL
POTASSIUM SERPL-SCNC: 4.7 MMOL/L
PROT SERPL-MCNC: 7.2 G/DL
RBC # BLD: 3.73 M/UL
RBC # FLD: 12.8 %
SODIUM SERPL-SCNC: 140 MMOL/L
TRIGL SERPL-MCNC: 66 MG/DL
TSH SERPL-ACNC: 0.68 UIU/ML
WBC # FLD AUTO: 6.61 K/UL

## 2022-01-17 NOTE — HISTORY OF PRESENT ILLNESS
[FreeTextEntry1] : 63 yo woman with obesity, DM2, metabolic syndrome, anxiety/depression returns for f/u.  She lost substantial weight on a mainly WFPB approach to eating and has lost rougly 25 lbs/  She feels she maintained most of that weight throughout the pandemic but has gained 8-10 lbs back over the past few months and is concerned that she is not on a good trajectory.  She previously was on glp-1 agonist but eventually stopped it and feels she does not have as much control as a result. \par \par  she is without new complaints today other than needing a new PCP.  \par

## 2022-02-15 ENCOUNTER — APPOINTMENT (OUTPATIENT)
Dept: BARIATRICS/WEIGHT MGMT | Facility: CLINIC | Age: 67
End: 2022-02-15
Payer: COMMERCIAL

## 2022-02-15 VITALS — HEIGHT: 66 IN | BODY MASS INDEX: 30.74 KG/M2 | WEIGHT: 191.25 LBS

## 2022-02-15 VITALS — HEART RATE: 61 BPM | DIASTOLIC BLOOD PRESSURE: 62 MMHG | SYSTOLIC BLOOD PRESSURE: 128 MMHG | OXYGEN SATURATION: 99 %

## 2022-02-15 PROCEDURE — 99213 OFFICE O/P EST LOW 20 MIN: CPT

## 2022-02-26 ENCOUNTER — RESULT REVIEW (OUTPATIENT)
Age: 67
End: 2022-02-26

## 2022-02-26 ENCOUNTER — APPOINTMENT (OUTPATIENT)
Dept: RADIOLOGY | Facility: CLINIC | Age: 67
End: 2022-02-26
Payer: COMMERCIAL

## 2022-02-26 ENCOUNTER — OUTPATIENT (OUTPATIENT)
Dept: OUTPATIENT SERVICES | Facility: HOSPITAL | Age: 67
LOS: 1 days | End: 2022-02-26
Payer: COMMERCIAL

## 2022-02-26 DIAGNOSIS — Z00.00 ENCOUNTER FOR GENERAL ADULT MEDICAL EXAMINATION WITHOUT ABNORMAL FINDINGS: ICD-10-CM

## 2022-02-26 PROCEDURE — 73130 X-RAY EXAM OF HAND: CPT

## 2022-02-26 PROCEDURE — 73110 X-RAY EXAM OF WRIST: CPT

## 2022-02-26 PROCEDURE — 73110 X-RAY EXAM OF WRIST: CPT | Mod: 26,LT

## 2022-02-26 PROCEDURE — 73130 X-RAY EXAM OF HAND: CPT | Mod: 26,LT

## 2022-02-28 ENCOUNTER — RESULT REVIEW (OUTPATIENT)
Age: 67
End: 2022-02-28

## 2022-02-28 ENCOUNTER — OUTPATIENT (OUTPATIENT)
Dept: OUTPATIENT SERVICES | Facility: HOSPITAL | Age: 67
LOS: 1 days | End: 2022-02-28
Payer: COMMERCIAL

## 2022-02-28 ENCOUNTER — APPOINTMENT (OUTPATIENT)
Dept: MAMMOGRAPHY | Facility: CLINIC | Age: 67
End: 2022-02-28
Payer: COMMERCIAL

## 2022-02-28 DIAGNOSIS — Z12.31 ENCOUNTER FOR SCREENING MAMMOGRAM FOR MALIGNANT NEOPLASM OF BREAST: ICD-10-CM

## 2022-02-28 PROCEDURE — 77063 BREAST TOMOSYNTHESIS BI: CPT | Mod: 26

## 2022-02-28 PROCEDURE — 77067 SCR MAMMO BI INCL CAD: CPT | Mod: 26

## 2022-02-28 PROCEDURE — 77067 SCR MAMMO BI INCL CAD: CPT

## 2022-02-28 PROCEDURE — 77063 BREAST TOMOSYNTHESIS BI: CPT

## 2022-03-02 DIAGNOSIS — M19.049 PRIMARY OSTEOARTHRITIS, UNSPECIFIED HAND: ICD-10-CM

## 2022-03-07 NOTE — HISTORY OF PRESENT ILLNESS
[FreeTextEntry1] : 67 yo woman with obesity, DM2, metabolic syndrome, anxiety/depression returns for f/u.  \par \par she has lost 3 lbs since last visit (now 191 lbs)\par eating more whole foods and plant based.\par now on trulicity 3.0mg without incident\par \par otherwise no new complaints today\par   ,

## 2022-03-07 NOTE — ASSESSMENT
[FreeTextEntry1] : BARIATRIC SURGERY HISTORY: none\par \par OBESITY COMORBIDITIES: DM2, metabolic syndrome\par \par ANTI-OBESITY MEDICATIONS: trulicity\par \par OBESITY MEDICATION SIDE EFFECTS: N/A\par \par \par 65 yo woman with obesity, DM2, metabolic syndrome, anxiety/depression \par \par Recommend the following:\par cont current lifestyle changes based on WFPB strategy\par increase physical activity as tolerated\par cont trulicity 3.0 mg\par \par rtc in 3-4 weeks.\par \par

## 2022-03-29 ENCOUNTER — APPOINTMENT (OUTPATIENT)
Dept: BARIATRICS/WEIGHT MGMT | Facility: CLINIC | Age: 67
End: 2022-03-29
Payer: COMMERCIAL

## 2022-03-29 VITALS
WEIGHT: 183 LBS | DIASTOLIC BLOOD PRESSURE: 82 MMHG | SYSTOLIC BLOOD PRESSURE: 124 MMHG | OXYGEN SATURATION: 98 % | BODY MASS INDEX: 29.41 KG/M2 | HEART RATE: 65 BPM | HEIGHT: 66 IN

## 2022-03-29 PROCEDURE — 99213 OFFICE O/P EST LOW 20 MIN: CPT

## 2022-03-29 NOTE — HISTORY OF PRESENT ILLNESS
[FreeTextEntry1] : 67 yo woman with obesity, DM2, metabolic syndrome, anxiety/depression returns for f/u.  \par \par has lost an additional 8 lbs.  Now down 11 since 1/2022 and 57 lbs in past 5+ years.\par eating more whole foods and plant based - though some deviation when traveling internationally \par now on trulicity 3.0mg without incident\par \par otherwise no new complaints today\par

## 2022-03-29 NOTE — ASSESSMENT
[FreeTextEntry1] : BARIATRIC SURGERY HISTORY: none\par \par OBESITY COMORBIDITIES: DM2, metabolic syndrome\par \par ANTI-OBESITY MEDICATIONS: trulicity\par \par OBESITY MEDICATION SIDE EFFECTS: N/A\par \par \par 65 yo woman with obesity, DM2, metabolic syndrome, anxiety/depression \par \par Recommend the following:\par cont current lifestyle changes based on WFPB strategy\par increase physical activity as tolerated\par cont trulicity 3.0 mg\par \par rtc in 4-6  weeks.\par \par

## 2022-03-31 ENCOUNTER — APPOINTMENT (OUTPATIENT)
Dept: ORTHOPEDIC SURGERY | Facility: CLINIC | Age: 67
End: 2022-03-31
Payer: COMMERCIAL

## 2022-03-31 ENCOUNTER — NON-APPOINTMENT (OUTPATIENT)
Age: 67
End: 2022-03-31

## 2022-03-31 VITALS
BODY MASS INDEX: 29.25 KG/M2 | HEIGHT: 66 IN | HEART RATE: 62 BPM | DIASTOLIC BLOOD PRESSURE: 77 MMHG | WEIGHT: 182 LBS | SYSTOLIC BLOOD PRESSURE: 133 MMHG

## 2022-03-31 DIAGNOSIS — M18.12 UNILATERAL PRIMARY OSTEOARTHRITIS OF FIRST CARPOMETACARPAL JOINT, LEFT HAND: ICD-10-CM

## 2022-03-31 PROCEDURE — 99203 OFFICE O/P NEW LOW 30 MIN: CPT

## 2022-03-31 RX ORDER — LOSARTAN POTASSIUM AND HYDROCHLOROTHIAZIDE 12.5; 1 MG/1; MG/1
100-12.5 TABLET ORAL
Qty: 30 | Refills: 0 | Status: ACTIVE | COMMUNITY
Start: 2022-02-22

## 2022-03-31 RX ORDER — COVID-19 TEST SPECIMEN COLLECT
MISCELLANEOUS MISCELLANEOUS
Qty: 1 | Refills: 0 | Status: ACTIVE | COMMUNITY
Start: 2022-01-30

## 2022-03-31 RX ORDER — DICLOFENAC SODIUM 1% 10 MG/G
1 GEL TOPICAL
Qty: 1 | Refills: 2 | Status: ACTIVE | COMMUNITY
Start: 2022-03-31 | End: 1900-01-01

## 2022-04-02 NOTE — PHYSICAL EXAM
[de-identified] : Left wrist\par Wrist motion : good without localizing findings\par Basal joint mild swelling\par Joint line tenderness 1-2+, recreating symptoms \par crepitus trace, 2+ pain\par \par Left hand:\par No A1 pulley tenderness and no triggering in any finger.\par No pertinent MP, PIP, or DIP joint contributory findings, except some Heberden's nodes; none are clinically painful.\par \par Right wrist\par Basal joint manipulation minimal to no crepitus\par No pain\par \par Left hand:\par No A1 pulley tenderness and no triggering in any finger.\par No pertinent MP, PIP, or DIP joint contributory findings, except some Heberden's nodes; none are clinically painful.\par \par \par Neurologic: Median, ulnar, and radial motor and sensory are intact. \par Carpal tunnel endoscopic surgical scars in the distal forearm bilaterally are fully healed.\par Skin: No cyanosis, clubbing, or rashes.\par Vascular: Radial pulses intact.\par Lymphatic: No streaking or epitrochlear adenopathy.\par The patient is awake, alert, and oriented. Affect appropriate. Cooperative.

## 2022-04-02 NOTE — HISTORY OF PRESENT ILLNESS
[FreeTextEntry1] : The patient is a 67 yo RHD female, BMI approximately 32, who is referred by PCP with complaints of left hand pain with associated weakness at the base of thumb that interferes with ADL, picking up grandchildren.  Patient also has history of "carpal tunnel syndrome" treated surgically, bilaterally, approx 2 yrs ago. Carlos Peterson MD.\par \par Patient has pain at the base of left thumb.  Radiographs were obtained at [facility] that shows stage II basal joint arthritis with slight sclerosis and narrowing.  No other notable hand or wrist radiographic findings.\par \par Patient works as  to  of [email or web address].\par Patient perceives constant pain at the basal joint.\par Pain exacerbated lifting grandchildren, most pinch activities.\par No pain on right.\par Patient has been taking over-the-counter naproxen, 2 tabs, 440 mg approximately every once a day or once every few days

## 2022-04-02 NOTE — DISCUSSION/SUMMARY
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  I have explained to patient that the incidence of basal joint arthritis increases with age and is common in individuals, especially women, and the seventh decade of life.  I utilized radiographs for educational purposes as well as providing printed educational information to patient.\par Treatment options include oral anti-inflammatory, topical anti-inflammatory and HMTS as initial treatment options.  I reviewed pros, cons, risk, advantages of each separately and in combination.\par Patient is willing to have a trial with oral meloxicam and to utilize HMTS.  Hand therapist is also instructed to provide joint education and activity preservation advice and technique.\par Prognosis limited because the condition is typically ongoing.\par Return as needed.\par A lengthy and detailed discussion was held with the patient regarding analysis, treatment, and recommendations. All questions have been answered. At the conclusion the patient expressed acceptance, understanding and agreement with the plan.

## 2022-07-14 ENCOUNTER — APPOINTMENT (OUTPATIENT)
Dept: INTERNAL MEDICINE | Facility: CLINIC | Age: 67
End: 2022-07-14

## 2022-07-14 VITALS
TEMPERATURE: 98.2 F | DIASTOLIC BLOOD PRESSURE: 70 MMHG | SYSTOLIC BLOOD PRESSURE: 114 MMHG | HEIGHT: 65.25 IN | BODY MASS INDEX: 28.31 KG/M2 | WEIGHT: 172 LBS | OXYGEN SATURATION: 97 % | HEART RATE: 59 BPM

## 2022-07-14 DIAGNOSIS — Z87.898 PERSONAL HISTORY OF OTHER SPECIFIED CONDITIONS: ICD-10-CM

## 2022-07-14 PROCEDURE — 99401 PREV MED CNSL INDIV APPRX 15: CPT

## 2022-07-14 PROCEDURE — 99214 OFFICE O/P EST MOD 30 MIN: CPT | Mod: 25

## 2022-07-14 NOTE — ASSESSMENT
[FreeTextEntry1] : 66F PMH obesity, HTN, T2DM, HLD, anxiety/depression, bilateral carpal tunnel, spontaneous pneumothorax, who presents for follow-up\par \par # HTN: She is on 3 antihypertensives (amlodipine 5 mg, Losartan 100 mg, HCTZ 25 mg, the one most recently added was HCTZ). Her BP today is well controlled at 114/70, which is likely due to a healthy WFPB diet and notable recent weight loss. There is some potential concern for ANASTASIA given elevated mallampatti and use of multiple antihypertensives (snoring unknown), however this would also improve with weight loss.\par - Will de-escalate antihypertensive regimen by removing HCTZ. Patient will take Amlodipine 5 mg + Losartan 100 mg. \par - Patient will start checking and recording home readings\par - Call office if significant elevation develops, can restart HCTZ 25 mg or half dose 12.5 mg depending on results\par - F/u in 1 month for BP check\par - Will hold off sleep study for now. \par - C/w weight management\par - C/w exercise\par - C/w healthy diet. \par \par # DM2/HLD: Well controlled on current regimen and lifestyle interventions. Recent labwork performed. \par \par # HCM: \par - Repeat mammogram, 6 months from last as per recommendation based on results.

## 2022-07-14 NOTE — PHYSICAL EXAM
[No Acute Distress] : no acute distress [Well-Appearing] : well-appearing [Normal Sclera/Conjunctiva] : normal sclera/conjunctiva [EOMI] : extraocular movements intact [Normal Outer Ear/Nose] : the outer ears and nose were normal in appearance [No JVD] : no jugular venous distention [Supple] : supple [No Respiratory Distress] : no respiratory distress  [No Accessory Muscle Use] : no accessory muscle use [Clear to Auscultation] : lungs were clear to auscultation bilaterally [Normal Rate] : normal rate  [Regular Rhythm] : with a regular rhythm [Normal S1, S2] : normal S1 and S2 [No Edema] : there was no peripheral edema [No Joint Swelling] : no joint swelling [No Rash] : no rash [Coordination Grossly Intact] : coordination grossly intact [Normal Gait] : normal gait [Speech Grossly Normal] : speech grossly normal [Normal Affect] : the affect was normal [Normal Mood] : the mood was normal

## 2022-07-14 NOTE — HISTORY OF PRESENT ILLNESS
[de-identified] : 66F PMH obesity, HTN, T2DM, HLD, anxiety/depression, bilateral carpal tunnel, spontaneous pneumothorax, who presents for follow-up\par 6-mo follow-up\par \par She has no acute complaints today.\par She has been following with weight management, has been adherent to an entirely plant-based, primarily whole food diet, avoids processed food. She has continued to lose weight. \par She has been compliant with her medications.\par \par Is active with her grandchildren (1 yr boy, 3 yr girl) but feels tired after being with them for a few hours straight.

## 2022-08-04 ENCOUNTER — APPOINTMENT (OUTPATIENT)
Dept: INTERNAL MEDICINE | Facility: CLINIC | Age: 67
End: 2022-08-04

## 2022-08-04 DIAGNOSIS — J02.9 ACUTE PHARYNGITIS, UNSPECIFIED: ICD-10-CM

## 2022-08-04 PROCEDURE — 99213 OFFICE O/P EST LOW 20 MIN: CPT

## 2022-08-04 NOTE — REVIEW OF SYSTEMS
[Sore Throat] : sore throat [Shortness Of Breath] : no shortness of breath [Wheezing] : no wheezing [Cough] : cough [Negative] : Psychiatric

## 2022-08-04 NOTE — PHYSICAL EXAM
[No Acute Distress] : no acute distress [Well-Appearing] : well-appearing [Normal Sclera/Conjunctiva] : normal sclera/conjunctiva [EOMI] : extraocular movements intact [Normal Outer Ear/Nose] : the outer ears and nose were normal in appearance [No JVD] : no jugular venous distention [No Respiratory Distress] : no respiratory distress  [Normal Rate] : normal rate  [Regular Rhythm] : with a regular rhythm [Normal S1, S2] : normal S1 and S2 [No Edema] : there was no peripheral edema [Soft] : abdomen soft [Non Tender] : non-tender [Non-distended] : non-distended [No CVA Tenderness] : no CVA  tenderness [No Spinal Tenderness] : no spinal tenderness [No Joint Swelling] : no joint swelling [Grossly Normal Strength/Tone] : grossly normal strength/tone [No Rash] : no rash [de-identified] : erythema oropharynx

## 2022-08-04 NOTE — HISTORY OF PRESENT ILLNESS
[FreeTextEntry8] : 66F PMH obesity, HTN, T2DM, HLD, anxiety/depression, bilateral carpal tunnel, spontaneous pneumothorax, who presents for an acute visit with sore throat\par \par Her symptoms have been ongoing for over 1 week.\par She denies fevers, chills, PND, nasal congestion, CP, shortness of breath\par She has had some cough.\par She was with her daughter last week and she was diagnosed with strep throat. \par She tested negative for COVID-19 on Friday and yesterday, b oth were home tests.

## 2022-08-04 NOTE — ASSESSMENT
[FreeTextEntry1] : 66F PMH obesity, HTN, T2DM, HLD, anxiety/depression, bilateral carpal tunnel, spontaneous pneumothorax, who presents for an acute visit with sore throat\par \par # Pharyngitis: although Centor criteria is very low for strep, given that she had close contact with her daughter just who was diagnosed with strep pharyngitis and this was just prior to her developing symptoms, and her own symptoms have been primarily of throat pain, will opt to treat. May stop if COVID-19 PCR returns positive and will check throat culture\par - Throat culture\par - Covid PCR\par - Will treat preemptively w/amoxicillin given timing of close exposure to strep aligning with her symptoms which are primarily throat pain.

## 2022-08-05 LAB — SARS-COV-2 N GENE NPH QL NAA+PROBE: NOT DETECTED

## 2022-08-08 ENCOUNTER — NON-APPOINTMENT (OUTPATIENT)
Age: 67
End: 2022-08-08

## 2022-08-08 LAB — BACTERIA THROAT CULT: NORMAL

## 2022-09-01 ENCOUNTER — APPOINTMENT (OUTPATIENT)
Dept: INTERNAL MEDICINE | Facility: CLINIC | Age: 67
End: 2022-09-01

## 2022-09-01 VITALS
WEIGHT: 174 LBS | TEMPERATURE: 97.8 F | DIASTOLIC BLOOD PRESSURE: 73 MMHG | HEIGHT: 65 IN | BODY MASS INDEX: 28.99 KG/M2 | SYSTOLIC BLOOD PRESSURE: 136 MMHG | HEART RATE: 62 BPM | OXYGEN SATURATION: 98 %

## 2022-09-01 DIAGNOSIS — Z87.09 PERSONAL HISTORY OF OTHER DISEASES OF THE RESPIRATORY SYSTEM: ICD-10-CM

## 2022-09-01 PROCEDURE — 99213 OFFICE O/P EST LOW 20 MIN: CPT

## 2022-09-01 RX ORDER — LOSARTAN POTASSIUM 100 MG/1
100 TABLET, FILM COATED ORAL
Qty: 90 | Refills: 1 | Status: DISCONTINUED | COMMUNITY
Start: 2022-07-14 | End: 2022-09-01

## 2022-09-01 RX ORDER — AMOXICILLIN 500 MG/1
500 TABLET, FILM COATED ORAL
Qty: 20 | Refills: 0 | Status: DISCONTINUED | COMMUNITY
Start: 2022-08-04 | End: 2022-09-01

## 2022-09-01 NOTE — PHYSICAL EXAM
[No Acute Distress] : no acute distress [Well-Appearing] : well-appearing [Normal Sclera/Conjunctiva] : normal sclera/conjunctiva [EOMI] : extraocular movements intact [Normal Outer Ear/Nose] : the outer ears and nose were normal in appearance [Normal Oropharynx] : the oropharynx was normal [No JVD] : no jugular venous distention [No Lymphadenopathy] : no lymphadenopathy [Supple] : supple [No Respiratory Distress] : no respiratory distress  [No Accessory Muscle Use] : no accessory muscle use [Clear to Auscultation] : lungs were clear to auscultation bilaterally [Normal Rate] : normal rate  [Regular Rhythm] : with a regular rhythm [Normal S1, S2] : normal S1 and S2 [No Edema] : there was no peripheral edema [Soft] : abdomen soft [Non Tender] : non-tender [Non-distended] : non-distended [Normal Bowel Sounds] : normal bowel sounds [Normal Anterior Cervical Nodes] : no anterior cervical lymphadenopathy [No Rash] : no rash [Coordination Grossly Intact] : coordination grossly intact [Normal Gait] : normal gait [Speech Grossly Normal] : speech grossly normal [Memory Grossly Normal] : memory grossly normal [Normal Affect] : the affect was normal [Normal Mood] : the mood was normal [Normal Insight/Judgement] : insight and judgment were intact

## 2022-09-01 NOTE — ASSESSMENT
[FreeTextEntry1] : 67F PMH obesity, HTN, T2DM, HLD, anxiety/depression, bilateral carpal tunnel, spontaneous pneumothorax, who presents for follow-up.\par \par # HTN: In July, was on Losartan-HCTZ 100-25 mg/d + Amlodipine 5 mg/d, we stopped HCTZ and at subsequent visit increased to 12.5 mg/d and her systolic BP has been running higher, borderline elevated.\par - C/w Losartan 100 mg/d and Amlodipine 5 mg/d\par - Will increase HCTZ back to 25 mg/d\par - C/w exercise, WFPB diet, weight management\par - Discussed goal to optimize BP for now, if she remains well controlled in the future to the degree that we consider medication reduction, we may want to consider reducing an alternative agent (ie Losartan or Amlodipine).\par - F/u in 2-3 months. \par \par # URI symptoms: resolved\par \par # HCM: patient's insurance covers mammogram elsewhere, re-ordered to be printed for requisition to do outside of [facility].

## 2022-09-01 NOTE — HISTORY OF PRESENT ILLNESS
[de-identified] : 67F PMH obesity, HTN, T2DM, HLD, anxiety/depression, bilateral carpal tunnel, spontaneous pneumothorax, who presents for follow-up.\par \par Her sore throat and URI symptoms from last month have completely resolved. \par \par At her visit in July, when she was on Losartan-HCTZ 100-25 mg/d + Amlodipine 5 mg/d, her BP was very well controlled so we tried discontinuing the HCTZ. Her BP ran high, so we increased HCTZ to 12.5 mg/d. She checks her BP twice per day and records it, has brought the log, and it has been ranging, diastolic always <80, systolic ranging significantly but appears to average in the high 130's. \par She feels less comfortable with how they are controlled now compared to when she stopped HCTZ. \par No symptoms.

## 2022-09-02 ENCOUNTER — RX RENEWAL (OUTPATIENT)
Age: 67
End: 2022-09-02

## 2022-09-02 RX ORDER — DULAGLUTIDE 3 MG/.5ML
3 INJECTION, SOLUTION SUBCUTANEOUS
Qty: 6 | Refills: 1 | Status: ACTIVE | COMMUNITY
Start: 2022-01-21 | End: 1900-01-01

## 2022-09-12 ENCOUNTER — APPOINTMENT (OUTPATIENT)
Dept: MAMMOGRAPHY | Facility: CLINIC | Age: 67
End: 2022-09-12

## 2022-09-16 ENCOUNTER — APPOINTMENT (OUTPATIENT)
Dept: INTERNAL MEDICINE | Facility: CLINIC | Age: 67
End: 2022-09-16

## 2022-09-16 VITALS
TEMPERATURE: 97.6 F | OXYGEN SATURATION: 99 % | SYSTOLIC BLOOD PRESSURE: 150 MMHG | BODY MASS INDEX: 28.32 KG/M2 | DIASTOLIC BLOOD PRESSURE: 81 MMHG | HEIGHT: 65 IN | HEART RATE: 68 BPM | WEIGHT: 170 LBS

## 2022-09-16 DIAGNOSIS — M72.2 PLANTAR FASCIAL FIBROMATOSIS: ICD-10-CM

## 2022-09-16 DIAGNOSIS — M76.60 ACHILLES TENDINITIS, UNSPECIFIED LEG: ICD-10-CM

## 2022-09-16 PROCEDURE — G0008: CPT

## 2022-09-16 PROCEDURE — 90662 IIV NO PRSV INCREASED AG IM: CPT

## 2022-09-16 PROCEDURE — 99214 OFFICE O/P EST MOD 30 MIN: CPT | Mod: 25

## 2022-09-16 NOTE — HISTORY OF PRESENT ILLNESS
[de-identified] : 67F PMH obesity, HTN, T2DM, HLD, anxiety/depression, bilateral carpal tunnel, spontaneous pneumothorax, who presents for follow-up.\par \par She presents for R ankle/foot pain.\par She describes it as feeling very tight at the heel. Feels she can barely walk except on her toes. \par Is aggravated by activites such as walking on sand and jumping in pool (activities with strong calf/planter flexion or passive stretch). \par She has been wearing flats, flip flops, and water slippers which she feels haven't offered much support. \par Has been ongoing for a few months. \par She has not taken any medication or any interventions for it, but she is on Meloxicam 7.5 mg for chronic hand pain.

## 2022-09-16 NOTE — ASSESSMENT
[FreeTextEntry1] : 67F PMH obesity, HTN, T2DM, HLD, anxiety/depression, bilateral carpal tunnel, spontaneous pneumothorax, who presents for follow-up.\par \par # Achilles tendonopathy c/b suspected plantar fasciitis: symptoms primarily appear to be tendonopathy of achilles with some distribution of pain along plantar fascia. \par - Avoid aggravating activities, but maintain gentle activity\par - Ice after use, heat when stretching or massage\par - PT referral\par - Podiatry referral\par \par # HCM:\par - Flu shot today.

## 2022-09-16 NOTE — PHYSICAL EXAM
[No Acute Distress] : no acute distress [Well-Appearing] : well-appearing [Normal Sclera/Conjunctiva] : normal sclera/conjunctiva [EOMI] : extraocular movements intact [Normal Outer Ear/Nose] : the outer ears and nose were normal in appearance [Normal Oropharynx] : the oropharynx was normal [No JVD] : no jugular venous distention [Supple] : supple [No Respiratory Distress] : no respiratory distress  [No Accessory Muscle Use] : no accessory muscle use [Clear to Auscultation] : lungs were clear to auscultation bilaterally [Normal Rate] : normal rate  [Regular Rhythm] : with a regular rhythm [Normal S1, S2] : normal S1 and S2 [No Edema] : there was no peripheral edema [Soft] : abdomen soft [Non Tender] : non-tender [Non-distended] : non-distended [Normal Anterior Cervical Nodes] : no anterior cervical lymphadenopathy [No CVA Tenderness] : no CVA  tenderness [No Spinal Tenderness] : no spinal tenderness [No Joint Swelling] : no joint swelling [Grossly Normal Strength/Tone] : grossly normal strength/tone [No Rash] : no rash [Coordination Grossly Intact] : coordination grossly intact [No Focal Deficits] : no focal deficits [Normal Gait] : normal gait [Speech Grossly Normal] : speech grossly normal [Memory Grossly Normal] : memory grossly normal [Normal Affect] : the affect was normal [Normal Insight/Judgement] : insight and judgment were intact

## 2022-10-03 RX ORDER — LOSARTAN POTASSIUM AND HYDROCHLOROTHIAZIDE 25; 100 MG/1; MG/1
100-25 TABLET ORAL DAILY
Qty: 90 | Refills: 3 | Status: ACTIVE | COMMUNITY
Start: 2022-01-13 | End: 1900-01-01

## 2022-12-16 ENCOUNTER — APPOINTMENT (OUTPATIENT)
Dept: ELECTROPHYSIOLOGY | Facility: CLINIC | Age: 67
End: 2022-12-16

## 2022-12-16 ENCOUNTER — APPOINTMENT (OUTPATIENT)
Dept: INTERNAL MEDICINE | Facility: CLINIC | Age: 67
End: 2022-12-16
Payer: COMMERCIAL

## 2022-12-16 ENCOUNTER — NON-APPOINTMENT (OUTPATIENT)
Age: 67
End: 2022-12-16

## 2022-12-16 VITALS
HEIGHT: 65 IN | DIASTOLIC BLOOD PRESSURE: 79 MMHG | HEART RATE: 57 BPM | WEIGHT: 177 LBS | TEMPERATURE: 97.8 F | SYSTOLIC BLOOD PRESSURE: 128 MMHG | BODY MASS INDEX: 29.49 KG/M2 | OXYGEN SATURATION: 98 %

## 2022-12-16 VITALS
HEIGHT: 65 IN | RESPIRATION RATE: 14 BRPM | OXYGEN SATURATION: 100 % | BODY MASS INDEX: 29.49 KG/M2 | HEART RATE: 66 BPM | DIASTOLIC BLOOD PRESSURE: 57 MMHG | WEIGHT: 177 LBS | SYSTOLIC BLOOD PRESSURE: 138 MMHG

## 2022-12-16 DIAGNOSIS — I44.7 LEFT BUNDLE-BRANCH BLOCK, UNSPECIFIED: ICD-10-CM

## 2022-12-16 DIAGNOSIS — Z00.00 ENCOUNTER FOR GENERAL ADULT MEDICAL EXAMINATION W/OUT ABNORMAL FINDINGS: ICD-10-CM

## 2022-12-16 DIAGNOSIS — E66.9 OBESITY, UNSPECIFIED: ICD-10-CM

## 2022-12-16 DIAGNOSIS — R01.2 OTHER CARDIAC SOUNDS: ICD-10-CM

## 2022-12-16 DIAGNOSIS — I10 ESSENTIAL (PRIMARY) HYPERTENSION: ICD-10-CM

## 2022-12-16 DIAGNOSIS — E78.5 HYPERLIPIDEMIA, UNSPECIFIED: ICD-10-CM

## 2022-12-16 DIAGNOSIS — E11.65 TYPE 2 DIABETES MELLITUS WITH HYPERGLYCEMIA: ICD-10-CM

## 2022-12-16 DIAGNOSIS — M25.569 PAIN IN UNSPECIFIED KNEE: ICD-10-CM

## 2022-12-16 PROCEDURE — 99204 OFFICE O/P NEW MOD 45 MIN: CPT

## 2022-12-16 PROCEDURE — 93000 ELECTROCARDIOGRAM COMPLETE: CPT | Mod: 59

## 2022-12-16 PROCEDURE — 99214 OFFICE O/P EST MOD 30 MIN: CPT | Mod: 25

## 2022-12-16 NOTE — ASSESSMENT
[FreeTextEntry1] : 67F PMH obesity, HTN, T2DM, HLD, anxiety/depression, bilateral carpal tunnel, spontaneous pneumothorax, who presents for follow-up.\par \par # 3rd heart sound, LBBB: EKG obtained due to 3rd heart sound, TTE ordered. EKG subsequently showing LBBB, no prior EKGs on file. Prior cardiologist (Josie Franco, [facility]) office called to expedite faxing over most recent EKG (~2 years ago), this was mildly bradycardic but did not show a LBBB, results along with stress echo being scanned to chart. Patient is completely asymptomatic at this time, and comorbidities are well controlled. \par - Cardiology referral, appointment expedited given new LBBB, to be seen today.\par - TTE and blood work ordered\par \par # Plantar fasciitis: undergoing PT, improving\par - C/w PT\par \par # Bilateral knee pain: L>R, left newer, 1 month. Suspect OA\par - X-ray knees\par - Ortho referral per pt request\par - May benefit from PT.

## 2022-12-16 NOTE — PHYSICAL EXAM
[No Acute Distress] : no acute distress [Well-Appearing] : well-appearing [Normal Sclera/Conjunctiva] : normal sclera/conjunctiva [EOMI] : extraocular movements intact [Normal Outer Ear/Nose] : the outer ears and nose were normal in appearance [Normal Oropharynx] : the oropharynx was normal [No JVD] : no jugular venous distention [Supple] : supple [No Respiratory Distress] : no respiratory distress  [No Accessory Muscle Use] : no accessory muscle use [Clear to Auscultation] : lungs were clear to auscultation bilaterally [Normal Rate] : normal rate  [Regular Rhythm] : with a regular rhythm [Normal S1, S2] : normal S1 and S2 [No Edema] : there was no peripheral edema [Declined Breast Exam] : declined breast exam  [No Axillary Lymphadenopathy] : no axillary lymphadenopathy [Soft] : abdomen soft [Non Tender] : non-tender [Non-distended] : non-distended [Normal Supraclavicular Nodes] : no supraclavicular lymphadenopathy [Normal Anterior Cervical Nodes] : no anterior cervical lymphadenopathy [No CVA Tenderness] : no CVA  tenderness [No Spinal Tenderness] : no spinal tenderness [No Joint Swelling] : no joint swelling [Grossly Normal Strength/Tone] : grossly normal strength/tone [No Rash] : no rash [Coordination Grossly Intact] : coordination grossly intact [Normal Gait] : normal gait [Speech Grossly Normal] : speech grossly normal [Normal Affect] : the affect was normal [Normal Mood] : the mood was normal [Normal Insight/Judgement] : insight and judgment were intact

## 2022-12-16 NOTE — HISTORY OF PRESENT ILLNESS
[de-identified] : 67F PMH obesity, HTN, T2DM, HLD, anxiety/depression, bilateral carpal tunnel, spontaneous pneumothorax, who presents for follow-up.\par \par She hasn't been checking her BP as much, but has remained within normal range, BP normal today. \par \par She has been undergoing PT for plantar fasciitis, slowly starting to improve.\par She notes knee pain developed on that knee and more recently and significantly on the L knee as well, which started about 1 month ago, is worst going down stairs, is not taking anything.

## 2022-12-16 NOTE — HISTORY OF PRESENT ILLNESS
[FreeTextEntry1] : 67-year-old female with history of obesity HTN diabetes type 2 dyslipidemia anxiety depression bilateral carpal tunnel syndrome spontaneous pneumothorax left bundle branch block on ECG.  Patient has exercise stress echo from 12/2020 negative for ischemia patient achieved target heart rate, exercised for 9 minutes to 93% of mphr.\par Pt has infrequent atypical chest discomfort, last episode a week ago lasting for 1 min. Denies exertional symptoms, cp, sob, dizziness, syncope. Pt currently has plantar fasciatis being treated. \par Denies cardiac surgeries. \par ecg from 12/16/22 shows Sinus carlos 53 bpm, LBBB.

## 2022-12-16 NOTE — DISCUSSION/SUMMARY
[FreeTextEntry1] : 67-year-old female with history of HTN obesity diabetes Planter fasciitis who presents to office today for left bundle branch block noted on ECGand sinus bradycardia asymptomatic. \par Patient denies any arrhythmia symptoms.  Patient should undergo cardiology evaluation with exercise stress echo in the setting of recent atypical chest discomfort\par Patient advised to follow-up with EP if any suspicion of arrhythmia symptoms\par \par Total length of time spent with this patient was 45 minutes and more then half of the time was spent face to face with the patient as well as counseling and coordination of care as stated above.\par

## 2023-04-10 ENCOUNTER — RX RENEWAL (OUTPATIENT)
Age: 68
End: 2023-04-10

## 2023-04-10 RX ORDER — MELOXICAM 7.5 MG/1
7.5 TABLET ORAL DAILY
Qty: 60 | Refills: 2 | Status: ACTIVE | COMMUNITY
Start: 2022-03-31 | End: 1900-01-01